# Patient Record
Sex: FEMALE | Race: WHITE | Employment: OTHER | URBAN - METROPOLITAN AREA
[De-identification: names, ages, dates, MRNs, and addresses within clinical notes are randomized per-mention and may not be internally consistent; named-entity substitution may affect disease eponyms.]

---

## 2017-02-14 ENCOUNTER — ALLSCRIPTS OFFICE VISIT (OUTPATIENT)
Dept: OTHER | Facility: OTHER | Age: 72
End: 2017-02-14

## 2017-06-06 ENCOUNTER — ALLSCRIPTS OFFICE VISIT (OUTPATIENT)
Dept: OTHER | Facility: OTHER | Age: 72
End: 2017-06-06

## 2017-10-03 ENCOUNTER — ALLSCRIPTS OFFICE VISIT (OUTPATIENT)
Dept: OTHER | Facility: OTHER | Age: 72
End: 2017-10-03

## 2017-10-04 NOTE — PROGRESS NOTES
Assessment  Assessed    1  Coronary arteriosclerosis (414 00) (I25 10)   2  Carotid artery disease (447 9) (I77 9)   3  Aortic stenosis (424 1) (I35 0)   4  Acquired insufficiency of aortic valve (424 1) (I35 1)   5  Hyperlipidemia (272 4) (E78 5)   6  Hypertension (401 9) (I10)   7  Obesity (278 00) (E66 9)   8  PVD (peripheral vascular disease) (443 9) (I73 9)   9  Tobacco use (305 1) (Z72 0)   10  Venous insufficiency (459 81) (I87 2)    Discussion/Summary  Cardiology Discussion Summary Free Text Note Form [facility]:   Patient is stable from a cardiac perspective no angina CHF symptomatically ectopy continue risk factor modification --- Smoking cessation strongly advised--patient declines  Recommend fu with pcp/vascular surgery to check carotid status peripheral vascular status-podiatry ophthalmology-  Continue all meds  Report any symptoms  All questions answered -atient with mild noncompromising edema due to venous disease worse with dependency suggest compression stockings if needed possible diuretics in the future if needed  Previous studies reviewed with patient, medications reviewed and possible side effects discussed  Continue risk factor modification  All questions answered  Safety measures reviewed  Patient advised to report any problems prompting medical attention  Discussed concepts of atherosclerosis, signs and symptoms of cardiac disease-including ischemia arrhythmia CHF  Optimize weight, regular exercise and follow up with appropriate specialists as discussed   Return for follow up visit in 4 months or earlier if neededquestions answeredwith PCP closely with lab testing cancer screening testing diabetic monitoring-strongly recommended the patient undergo mammography and routine cancer testing        Counseling Documentation With Imm: The patient was counseled regarding diagnostic results,-instructions for management,-risk factor reductions,-risks and benefits of treatment options,-importance of compliance with treatment  Chief Complaint  Chief Complaint Free Text Note Form: Patient presents for follow up cardiovascular evaluation  Has history of CAD, stent, Status post right carotid endarterectomy , peripheral vascular disease-COPD continues to smoke  History of Present Illness  Cardiology HPI Free Text Note Form [facility]: This is a 71 y/o female with a history of CAD, stent -- s/p right carotid endarterectomy , continues to smoke despite smoking cessation instruction Patient is feeling well from a cardiac perspective- she notes chronic, unchanged noncompromising, shortness of breath on exertion, she has a history of copd and feels it is stable despite chronic cough  No hemoptysis night sweats weight loss she denies any chest pain, pressure, heaviness  No angina, CHF, palpitations, syncope, seizures, CVA/TIA, claudication dizziness, lightheadedness, amaurosis, orthostasis, myositis or edema  No urinary or bowel complaints  No rashes, fevers, arrhythmic symptoms, or thromboembolic symptoms  No PND  chronic hip pain status post bilateral replacements-, has been following with orthopedics  no angina palpitations bleeding fevers depressionby ophthalmology told of the cataract   relates that in 2016 she was followed by vascular surgeon who found abnormalities in her left leg and put instent in her iliac and femoral arteries-Dr Moncho Rausch associateHe follows her regularly  claudication-patient had vascular ultrasound scheduled shortly with her vascular surgeons in 2017- carotid results demonstrate 50-69% bilateral stenosis asymptomatic--no neurologic symptoms- surgeons been following closely does not suggest intervention at this time  relates that 71-year-old granddaughter  of drug problemsstates her blood sugars were followed by Dr Laura Sanchez been running in the 80s    Had recent [facility] scan 2014 negative for ischemia, EF 75%   Echo 2016 aortic regurgitation- mild,, and mild aortic stenosis  continues to smoke about 2 packs/day and refuses to quit at this time- despite lengthy discussion on importance - her  smokes in addition  rashes, fevers, arrhythmic symptoms, thromboembolic symptoms  On plavix and ASA- denies any bleeding  neurologic symptoms, CVA, TIA, amaurosis no claudication,  - Dr Margaux Jon blood tests cancer screening testing  has a history of CAD, status post stent- RCA in 2002,, catheterization 2008 patent vesselsof diabetes, hypertension, hyperlipidemia, and smoking-- no polyuria polydipsia no myositis no wheezes hemoptysis night sweatshistory of rheumatic fever, adenopathy, serious valvular heart disease  No CVA, TIA, rheumatic disease    cardiac workup included stress testing, which was normal in 2009  Carotid ultrasound 50-69% stenosis right carotid in 2011  Echo in 2011 with EF 60% trace MR, aortic gradient peak 11  Chest x-ray July of 2012 with interstitial disease  Carotid Ultrasound 2014 with 80-99% stenosis on the right--subsequent endarterectomy -followed by vascular surgery regularly  claudication arrhythmic symptoms amaurosis CVA urinary or bowel complaints constitutional symptoms depression EKGs in the past shows sinus rhythm poor R-wave progression left atrial enlargement  follows with PCP for diabetes no polyuria polydipsia - suggest regular podiatry examinations cancer screening testing ophthalmology evaluations no myositis on statins no diarrhea or abdominal pain on metformin no hypoglycemia no bleeding on aspirin Plavix  had colonoscopy with the last 10 years  Has not had mammogram- does self checks and does not feel that she needs one  --Recommended that she undergo mammography-and reminded to discuss this with PCP  tests from February 2016 by Dr Ruddy Morales with cholesterol of 1:15 normal LFTs normal renal function sugar was 147 uric acid normal hematocrit 52 normal white count and plateletsfrom June 9493 with EF of 65% mild MR very mild aortic stenosis and mild aortic regurgitationstudy from June 2016 with 50-69% right stenosis less than 50% left stenosis  myositis on statins no bleeding on aspirin Plavix ration desires to continue this reviewed risks and benefits of bleeding history of PVD carotid disease CAD no coughing on ACEs no orthostasis taking insulin following up with PCP / specialists--- lab tests February 2017 sugar 120 renal function and electrolytes normal liver function tests normal triglycerides 153 hematocrit 51 A1c 6 5  CVA TIA amaurosis orthostasis myositis in good spirits         Review of Systems  Cardiology Female ROS:     Cardiac: No complaints of chest pain, no palpitations, no fainting  Skin: No complaints of nonhealing sores or skin rash  Genitourinary: No complaints of recurrent urinary tract infections, frequent urination at night, difficult urination, blood in urine, kidney stones, loss of bladder control, kidney problems, denies any birth control or hormone replacement, is not post menopausal, not currently pregnant  Psychological: No complaints of feeling depressed, anxiety, panic attacks, or difficulty concentrating  General: No complaints of trouble sleeping, lack of energy, fatigue, appetite changes, weight changes, fever, frequent infections, or night sweats  Respiratory: shortness of breath-and-cough/sputum-  Chronic shortness of breath with exertion unchanged occasional nonproductive cough no wheezing hemoptysis  HEENT: No complaints of serious problems, hearing problems, nose problems, throat problems, or snoring  Gastrointestinal: No complaints of liver problems, nausea, vomiting, heartburn, constipation, bloody stools, diarrhea, problems swallowing, adbominal pain, or rectal bleeding  Hematologic: No complaints of bleeding disorders, anemia, blood clots, or excessive brusing     Neurological: No complaints of numbness, tingling, dizziness, weakness, seizures, headaches, syncope or fainting, AM fatigue, daytime sleepiness, no witnessed apnea episodes  Musculoskeletal: arthritis-Status post bilateral hip surgery  Active Problems  Problems    1  Abnormal blood chemistry test (790 6) (R79 9)   2  Acquired insufficiency of aortic valve (424 1) (I35 1)   3  Aortic stenosis (424 1) (I35 0)   4  Carotid artery disease (447 9) (I77 9)   5  Carotid artery stenosis (433 10) (I65 29)   6  Coronary arteriosclerosis (414 00) (I25 10)   7  Diabetes mellitus (250 00) (E11 9)   8  Difficulty breathing (786 09) (R06 89)   9  Hyperlipidemia (272 4) (E78 5)   10  Hypertension (401 9) (I10)   11  Obesity (278 00) (E66 9)   12  Occlusion of carotid artery, unspecified laterality (433 10) (I65 29)   13  PVD (peripheral vascular disease) (443 9) (I73 9)   14  Shortness of breath (786 05) (R06 02)   15  Tobacco use (305 1) (Z72 0)    Past Medical History  Problems    1  History of Coronary Artery Disease (V12 59)   2  History of carotid artery stenosis (V12 59) (Z86 79)   3  History of chronic obstructive lung disease (V12 69) (Z87 09)   4  History of hyperlipidemia (V12 29) (Z86 39)   5  History of hypertension (V12 59) (Z86 79)   6  History of Type 1 diabetes mellitus without complication (032 38) (B01 9)  Active Problems And Past Medical History Reviewed: The active problems and past medical history were reviewed and updated today  Surgical History  Problems    1  History of Appendectomy   2  History of Cath Stent Placement   3  History of Cholecystectomy  Surgical History Reviewed: The surgical history was reviewed and updated today  Family History  Family History    1  Family history of Coronary Artery Disease (V17 49)   2  Family history of Diabetes Mellitus (V18 0)  Family History Reviewed: The family history was reviewed and updated today         Social History  Problems    · Denied: History of Alcohol Use (History)   · Current Smoker (305 1)   · Current Smoker (V15 82)   · Tobacco use (305 1) (Z72 0)  Social History Reviewed: The social history was reviewed and updated today  The social history was reviewed and is unchanged  Current Meds   1  Acidophilus CAPS Recorded   2  Aspirin 81 MG TABS; Take 1 tablet daily Recorded   3  Atorvastatin Calcium 20 MG Oral Tablet; TAKE 1 TABLET DAILY; Therapy: [id number] to (Evaluate:22Jun2016) Recorded   4  Clopidogrel Bisulfate 75 MG Oral Tablet; Take 1 tablet daily Recorded   5  GlipiZIDE 5 MG Oral Tablet; 1 tab TID; Therapy: (Recorded:17Mar2015) to Recorded   6  Invokana 100 MG Oral Tablet; 1/2 tab po daily; Therapy: (Recorded:06Jun2017) to Recorded   7  Lisinopril 5 MG Oral Tablet; Take 1 tablet daily Recorded   8  MetFORMIN HCl - 1000 MG Oral Tablet; Take 1 tablet twice daily Recorded   9  Metoprolol Tartrate 50 MG Oral Tablet; TAKE 1 TABLET TWICE DAILY; Therapy: (Recorded:17Mar2015) to Recorded   10  Toujeo SoloStar 300 UNIT/ML Subcutaneous Solution Pen-injector; 23 units qhs;    Therapy: [id number] to Recorded   11  Vitamin B6 TABS; Therapy: (Recorded:07Apr2014) to Recorded  Medication List Reviewed: The medication list was reviewed and updated today  Allergies  Medication    1  No Known Drug Allergies    Vitals  Vital Signs    Recorded: [id number] 09:00AM   Heart Rate 77   Systolic 489   Diastolic 72   Height 5 ft    Weight 157 lb 2 08 oz   BMI Calculated 30 69   BSA Calculated 1 68     Physical Exam    Constitutional   General appearance: Abnormal  -Obese female in no distress accompanied by friend  Eyes   Conjunctiva and Sclera examination: Conjunctiva pink, sclera anicteric  Ears, Nose, Mouth, and Throat - External inspection of ears and nose: Normal without deformities or discharge -Nasal mucosa, septum, and turbinates: Normal, no edema or discharge -Oropharynx: Clear, nares are clear, mucous membranes are moist    Neck   Neck and thyroid: Normal, supple, trachea midline, no thyromegaly      Pulmonary   Respiratory effort: No increased work of breathing or signs of respiratory distress  Auscultation of lungs: Abnormal  -diminshed breath sounds, slightwheeze heard biltearlly scattered rhonchi, cough  Cardiovascular   Palpation of heart: Normal PMI, no thrills  Auscultation of heart: Abnormal  -S1-S2 normal no diastolic murmurs gallops thrills rubs 2/6 EM KOFI, right sternal border, radiating to apex  Carotid pulses: Abnormal  -bruits bilaterally  Femoral pulses: Abnormal  -soft left femoral bruit  Pedal pulses: Abnormal  -R +2, L+2  Examination of extremities for edema and/or varicosities: Abnormal  -Trace edema mild venous insufficiency skin changes  Chest - Chest: Normal    Abdomen   Abdomen: Non-tender and no distention  -Old cholecystectomy scar  Liver and spleen: No hepatomegaly or splenomegaly  Musculoskeletal Gait and station: Normal gait  -Digits and nails: Normal without clubbing or cyanosis  Skin - Skin and subcutaneous tissue: Normal without rashes or lesions  Skin is warm and well perfused, normal turgor  -appendectomy and gall bladder scars- healed well -Well-healed right carotid endarterectomy incision  Neurologic - Speech: Normal     Psychiatric - Orientation to person, place, and time: Normal -Mood and affect: Normal    Additional Findings - Bilateral hip surgery        Signatures   Electronically signed by : UMA Erwin ; Oct  3 2017 12:39PM EST                       (Author)

## 2017-11-09 ENCOUNTER — GENERIC CONVERSION - ENCOUNTER (OUTPATIENT)
Dept: OTHER | Facility: OTHER | Age: 72
End: 2017-11-09

## 2018-01-13 VITALS
SYSTOLIC BLOOD PRESSURE: 124 MMHG | HEART RATE: 88 BPM | HEIGHT: 60 IN | DIASTOLIC BLOOD PRESSURE: 70 MMHG | WEIGHT: 155.5 LBS | BODY MASS INDEX: 30.53 KG/M2

## 2018-01-13 VITALS
WEIGHT: 161.13 LBS | BODY MASS INDEX: 31.64 KG/M2 | SYSTOLIC BLOOD PRESSURE: 118 MMHG | HEART RATE: 84 BPM | HEIGHT: 60 IN | DIASTOLIC BLOOD PRESSURE: 62 MMHG

## 2018-01-14 VITALS
BODY MASS INDEX: 30.85 KG/M2 | SYSTOLIC BLOOD PRESSURE: 122 MMHG | HEIGHT: 60 IN | DIASTOLIC BLOOD PRESSURE: 72 MMHG | WEIGHT: 157.13 LBS | HEART RATE: 77 BPM

## 2018-05-23 RX ORDER — SELENIUM 50 MCG
TABLET ORAL DAILY
COMMUNITY

## 2018-05-23 RX ORDER — ATORVASTATIN CALCIUM 20 MG/1
1 TABLET, FILM COATED ORAL DAILY
COMMUNITY
Start: 2016-02-23

## 2018-05-23 RX ORDER — GLIPIZIDE 5 MG/1
1 TABLET ORAL 3 TIMES DAILY
COMMUNITY

## 2018-05-23 RX ORDER — MULTIVITAMIN WITH IRON
2 TABLET ORAL 2 TIMES DAILY
COMMUNITY

## 2018-05-23 RX ORDER — LISINOPRIL 5 MG/1
10 TABLET ORAL DAILY
COMMUNITY
End: 2019-11-11 | Stop reason: SDUPTHER

## 2018-05-23 RX ORDER — METOPROLOL TARTRATE 75 MG/1
1 TABLET, FILM COATED ORAL 2 TIMES DAILY
COMMUNITY
End: 2020-01-21

## 2018-05-23 RX ORDER — CLOPIDOGREL BISULFATE 75 MG/1
1 TABLET ORAL DAILY
COMMUNITY

## 2018-06-05 ENCOUNTER — OFFICE VISIT (OUTPATIENT)
Dept: CARDIOLOGY CLINIC | Facility: CLINIC | Age: 73
End: 2018-06-05
Payer: MEDICARE

## 2018-06-05 VITALS
SYSTOLIC BLOOD PRESSURE: 124 MMHG | HEIGHT: 60 IN | WEIGHT: 157 LBS | HEART RATE: 74 BPM | BODY MASS INDEX: 30.82 KG/M2 | OXYGEN SATURATION: 96 % | DIASTOLIC BLOOD PRESSURE: 78 MMHG

## 2018-06-05 DIAGNOSIS — I65.23 ARTERIOSCLEROSIS OF BOTH CAROTID ARTERIES: ICD-10-CM

## 2018-06-05 DIAGNOSIS — I35.1 NONRHEUMATIC AORTIC VALVE INSUFFICIENCY: ICD-10-CM

## 2018-06-05 DIAGNOSIS — E78.2 MIXED HYPERLIPIDEMIA: ICD-10-CM

## 2018-06-05 DIAGNOSIS — Z72.0 TOBACCO ABUSE: ICD-10-CM

## 2018-06-05 DIAGNOSIS — I25.10 CAD S/P PERCUTANEOUS CORONARY ANGIOPLASTY: Primary | ICD-10-CM

## 2018-06-05 DIAGNOSIS — I73.9 PAD (PERIPHERAL ARTERY DISEASE) (HCC): ICD-10-CM

## 2018-06-05 DIAGNOSIS — I35.0 NON-RHEUMATIC AORTIC STENOSIS: ICD-10-CM

## 2018-06-05 DIAGNOSIS — I10 ESSENTIAL HYPERTENSION: ICD-10-CM

## 2018-06-05 DIAGNOSIS — Z98.61 CAD S/P PERCUTANEOUS CORONARY ANGIOPLASTY: Primary | ICD-10-CM

## 2018-06-05 DIAGNOSIS — I83.93 VARICOSE VEINS OF BOTH LOWER EXTREMITIES: ICD-10-CM

## 2018-06-05 PROCEDURE — 99215 OFFICE O/P EST HI 40 MIN: CPT | Performed by: INTERNAL MEDICINE

## 2018-06-05 NOTE — PROGRESS NOTES
CARDIOLOGY OFFICE VISIT  [facility]  [address], [facility], [address] [facility], [facility] [place] Vicente  Tel: [phone]      NAME: Sherrie Cheema  AGE: 71 y o  SEX: female  : 1945   MRN: [id number]      Chief Complaint:  Chief Complaint   Patient presents with    Follow-up     formed Dr Mary Jo Hayes patient  AS, HLD, HTN  History of Present Illness:   Patient of Dr Mary Jo Hayes who comes for follow up  States she is doing well from cardiac stand point and denies chest pain / pressure, palpitations, lightheadedness, syncope, swelling feet, orthopnea, PND, claudication  She notes chronic, unchanged, noncompromising, shortness of breath on exertion - she has a history of copd and feels it is stable  CAD s/p RCA stent  -  States is doing well cardiac wise with no cardiac symptoms  Taking all medications regularly  On ASA, statin, BB, ACEI    HTN -  Has been hypertensive for many years  Taking medications regularly  Denies lightheadedness, headache, medication side effects  HLP -  Has had hyperlipidemia for many years  Taking statin regularly along with diet control  Denies myalgia  PCP closely monitoring the blood work  AS, AI - stable  Asymptomatic    Carotid arteriosclerosis bilateral - S/P right carotid endarterectomy   On aspirin, Plavix, statin  Follows with vascular surgeon    PAD - S/P stents in her left iliac and femoral arteries in 2016  On aspirin, Plavix, statin    Follows with vascular surgeon    Bilateral varicose veins in the legs    Tobacco abuse - continues to smoke despite rptd smoking cessation counseling       Past Medical History:  Past Medical History:   Diagnosis Date    Aortic stenosis     Aortic stenosis     Coronary arteriosclerosis     Coronary arteriosclerosis     Coronary artery disease     Coronary artery sclerosis     Diabetes mellitus ([facility] [address] )     Hyperlipidemia     Hypertension     Obesity  Occlusion of carotid artery     Peripheral artery disease (HCC)          Past Surgical History:  Past Surgical History:   Procedure Laterality Date    APPENDECTOMY      CHOLECYSTECTOMY      CORONARY STENT PLACEMENT           Family History:  Family History   Problem Relation Age of Onset    Coronary artery disease Neg Hx     Diabetes Neg Hx          Social History:  Social History     Social History    Marital status: /Civil Union     Spouse name: N/A    Number of children: N/A    Years of education: N/A     Social History Main Topics    Smoking status: Current Every Day Smoker    Smokeless tobacco: Not on file    Alcohol use No    Drug use: Unknown    Sexual activity: Not on file     Other Topics Concern    Not on file     Social History Narrative    No narrative on file         Active Problems:  Patient Active Problem List   Diagnosis    CAD S/P percutaneous coronary angioplasty    Essential hypertension    Mixed hyperlipidemia    Non-rheumatic aortic stenosis    Nonrheumatic aortic valve insufficiency    [facility]AD [facility]peripheral artery disease) ([facility])    Tobacco abuse    Varicose veins of both lower extremities         The following portions of the patient's history were reviewed and updated as appropriate: past medical history, past surgical history, past family history,  past social history, current medications, allergies and problem list       Review of Systems:  Constitutional: Denies fever, chills, fatigue  Eyes: Denies eye redness, eye discharge, double vision  ENT: Denies hearing loss, tinnitus, sneezing, nasal discharge, sore throat   Respiratory: Denies cough, expectoration, hemoptysis   +shortness of breath  Cardiovascular: Denies chest pain, palpitations, orthopnea, PND, lower extremity swelling  Gastrointestinal: Denies abdominal pain, nausea, vomiting, hematemesis, diarrhea, bloody stools  Genito-Urinary: Denies dysuria, incontinence  Musculoskeletal: Denies back pain, joint pain, muscle pain  Neurologic: Denies confusion, lightheadedness, syncope, headache, focal weakness, sensory changes, seizures  Endocrine: Denies polyuria, polydipsia, temperature intolerance  Allergy and Immunology: Denies hives, insect bite sensitivity  Hematological and Lymphatic: Denies bleeding problems, swollen glands   Psychological: Denies depression, suicidal ideation, anxiety, panic  Dermatological: Denies pruritus, rash, skin lesion changes      Vitals:  Vitals:    06/05/18 0902   BP: 124/78   Pulse: 74   SpO2: 96%       Body mass index is 30 66 kg/m²  Weight (last 2 days)     Date/Time   Weight    06/05/18 0902  71 2 (157)                Physical Examination:  General: Patient is not in acute distress  Awake, alert, oriented in time, place and person  Responding to commands  Head: Normocephalic  Atraumatic  Eyes: Both pupils normal sized, round and reactive to light  Nonicteric  ENT: Normal external ear canals  Nares normal, no drainage  Lips and oral mucosa normal  Neck: Supple  JVP not raised  Trachea central  No thyromegaly  Lungs: Bilateral bronchovascular breath sounds with no crackles or rhonchi  Chest wall: No tenderness  Cardiovascular: RRR  S1 and S2 normal  Grade 3/6 KOFI at base  No rub or gallop  Gastrointestinal: Abdomen soft, nontender  No guarding or rigidity  Liver and spleen not palpable  Bowel sounds present  Neurologic: Patient is awake, alert, oriented in time, place and person  Responding to command  Moving all extremities  Integumentary:  No skin rash  Lymphatic: No cervical lymphadenopathy  Back: Symmetric   No CVA tenderness  Extremities: No clubbing, cyanosis or edema      Cardiac testing:   Results for orders placed during the hospital encounter of 06/15/16   Echo complete with contrast if indicated    [place] [address], [address] [place] [address]  [phone]    Transthoracic Echocardiogram  2D, M-mode, and Color Doppler    Study date: 15-Curtis-2016    Patient: Evita Sheehan  MR number: [id number]  Account number: [de-identified]  : [id number]  Age: 72 years  Gender: Female  Status: Outpatient  Location: [facility]  Height: 60 in  Weight: 161 lb  BP: 122/ 60 mmHg    Indications: CAD  Diagnoses: I25 10 - Atherosclerotic heart disease of native coronary artery  without angina pectoris    Sonographer:  Scout Bone, ANGEL  Primary Physician:  Krys Rodriguez DO  Referring Physician:  Ronda Naqvi MD  Group:  [facility]  Interpreting Physician:  Ronda Naqvi MD    SUMMARY    LEFT VENTRICLE:  There were no regional wall motion abnormalities  There was mild concentric hypertrophy  Doppler parameters were consistent with high ventricular filling pressure  LEFT ATRIUM:  The atrium was mildly dilated  MITRAL VALVE:  There was moderate annular calcification  There was mild regurgitation  AORTIC VALVE:  There was very mild stenosis  There was mild regurgitation  TRICUSPID VALVE:  There was trace regurgitation  AORTA:  The root exhibited normal size and mild fibrocalcific change  COMPARISONS:  no significant change from     HISTORY: PRIOR HISTORY: CAD  Risk factors: hypertension, insulin-controlled  diabetes, oral hypoglycemic-treated diabetes, medication-treated  hypercholesterolemia, morbid obesity, a history of current cigarette use  (within the last month), and a family history of coronary artery disease  Chronic lung disease  PRIOR PROCEDURES: Diagnostic cath  Stent  PROCEDURE: The study was performed in the [address] [place] [address]  This  was a routine study  The transthoracic approach was used  The study included  complete 2D imaging, M-mode, and color Doppler  The heart rate was 70 bpm, at  the start of the study  Images were obtained from the parasternal, apical,  subcostal, and suprasternal notch acoustic windows  Image quality was adequate      LEFT VENTRICLE: Size was normal  Systolic function was by visual assessment  Ejection fraction was estimated to be 65 %  There were no regional wall motion  abnormalities  There was mild concentric hypertrophy  DOPPLER: Doppler  parameters were consistent with high ventricular filling pressure  RIGHT VENTRICLE: The size was normal  Systolic function was normal  Wall  thickness was normal     LEFT ATRIUM: The atrium was mildly dilated  RIGHT ATRIUM: Size was normal     MITRAL VALVE: There was moderate annular calcification  DOPPLER: There was mild  regurgitation  AORTIC VALVE: DOPPLER: There was very mild stenosis  There was mild  regurgitation  TRICUSPID VALVE: DOPPLER: There was trace regurgitation  PERICARDIUM: There was no pericardial effusion  The pericardium was normal in  appearance  AORTA: The root exhibited normal size and mild fibrocalcific change  PULMONARY ARTERY: DOPPLER: Systolic pressure was within the normal range  SYSTEM MEASUREMENT TABLES    Apical four chamber  4 chamber Left Atrium Volume Index; Planimetry; End Systole; Apical four  chamber;: 14 2 cm2 Left Ventricular Diastolic Area; Method of Disks, Single  Plane; End Diastole; Apical four chamber;: 22 54 cm2 Left Ventricular Ejection  Fraction; Method of Disks, Single Plane; Apical four chamber;: 68 % Left  Ventricular systolic Area; Method of Disks, Single Plane; End Systole; Apical  four chamber;: 11 68 cm2 Right Atrium Systolic Area; Planimetry; End Systole; Apical four chamber;: 12 25 cm2 Right Ventricular Internal Diastolic Dimension; End Diastole; Apical four chamber;: 32 2 mm    Unspecified Scan Mode  Aortic Root Diameter; End Systole;: 28 5 mm  Aortic valve Area; Continuity Equation by Velocity Time Integral; Systole;:  1 42 cm2  Cardiovascular Orifice Diameter; End Systole;: 18 6 mm  Gradient Pressure, Peak; Simplified Bernoulli;  Antegrade Flow; Systole;: 16 1  mm[Hg] Gradient pressure, average; Simplified Bernoulli; Antegrade Flow;  Systole;: 8 mm[Hg]  Left atrial diameter; End Diastole;: 39 9 mm  Cardiac Output; Teichholz; Systole;: 3 42 L/min  Heart rate; Teichholz;: 70 /min  Interventricular Septum Diastolic Thickness; Teichholz; End Diastole;: 11 5 mm  Left Ventricle Internal End Diastolic Dimension; Teichholz;: 39 8 mm  Left Ventricle Internal Systolic Dimension; Teichholz; End Systole;: 24 1 mm  Left Ventricle Mass; Mass AVCube with Teichholz; End Diastole;: 144 g  Left Ventricle Posterior Wall Diastolic Thickness; Teichholz; End Diastole;:  10 4 mm  Left Ventricular Ejection Fraction; Teichholz;: 70 5 %  Left Ventricular End Diastolic Volume; Teichholz;: 69 2 ml  Left Ventricular End Systolic Volume; Teichholz;: 20 4 ml  Left Ventricular Fractional Shortening;: 39 4 %  Stroke volume; Teichholz; Systole;: 48 8 ml  Gradient Pressure, Peak; Simplified Bernoulli; Antegrade Flow; Diastole;: 6 7  mm[Hg] Gradient pressure, average; Simplified Bernoulli;  Antegrade Flow;  Diastole;: 2 8 mm[Hg]  Mitral Valve Area; Area by Pressure Half-Time; Systole;: 2 18 cm2  Mitral Valve E to A Ratio; Systole;: 0 95  Maximum Tricuspid valve regurgitation pressure gradient; Regurgitant Flow;  Systole;: 20 6 mm[Hg]    [facility]    Prepared and electronically signed by    Ronda Naqvi MD  Signed 15-Curtis-2016 13:20:51         Medications:    Current Outpatient Prescriptions:     aspirin 81 MG tablet, Take 1 tablet by mouth daily, Disp: , Rfl:     atorvastatin (LIPITOR) 20 mg tablet, Take 1 tablet by mouth daily, Disp: , Rfl:     clopidogrel (PLAVIX) 75 mg tablet, Take 1 tablet by mouth daily, Disp: , Rfl:     glipiZIDE (GLUCOTROL) 5 mg tablet, Take 1 tablet by mouth 3 (three) times a day, Disp: , Rfl:     lactobacillus acidophilus, Take by mouth, Disp: , Rfl:     lisinopril (ZESTRIL) 5 mg tablet, Take 1 tablet by mouth daily, Disp: , Rfl:     metFORMIN (GLUCOPHAGE) 1000 MG tablet, Take 1 tablet by mouth 2 (two) times a day, Disp: , Rfl:     metoprolol tartrate (LOPRESSOR) 50 mg tablet, Take 1 tablet by mouth 2 (two) times a day, Disp: , Rfl:     Pyridoxine HCl (VITAMIN B-6 PO), Take by mouth, Disp: , Rfl:       Allergies:  No Known Allergies      Assessment and Plan:  1  CAD S/P percutaneous coronary angioplasty  Stable  Continue aspirin, statin, beta-blocker    2  Essential hypertension  BP at goal   Continue current medications    3  Mixed hyperlipidemia  Continue statin and diet control  Her PCP closely monitor the blood work    4  Non-rheumatic aortic stenosis  Stable  Asymptomatic  Follow-up with serial echocardiograms    5  Nonrheumatic aortic valve insufficiency  Stable  Asymptomatic  Follow-up with serial echocardiograms    6  Arteriosclerosis of both carotid arteries  Continue aspirin, Plavix, statin  Follow-up with vascular surgeon    7  PAD (peripheral artery disease) (HCC)  Continue aspirin, Plavix, statin  Follow-up with vascular surgeon    8  Varicose veins of both lower extremities  Keep legs elevated while in bed  9  Tobacco abuse  Strongly counseled to stop smoking    Recommend aggressive risk factor modification and therapeutic lifestyle changes  Low-salt, low-calorie, low-fat, low-cholesterol diet with regular exercise and to optimize weight  I will defer the ordering and monitoring of necessity lab studies to you, but I am available and happy to review and manage any of the data at your request in the future  Discussed concepts of atherosclerosis, including signs and symptoms of cardiac disease  Previous studies were reviewed  Safety measures were reviewed  Questions were entertained and answered  Patient was advised to report any problems requiring medical attention  Follow-up with PCP and appropriate specialist and lab work as discussed  Return for follow up visit as scheduled or earlier, if needed    Thank you for allowing me to participate in the care and evaluation of your patient  Should you have any questions, please feel free to contact me        Lizz De La Cruz MD  9/5/1940,5:36 AM

## 2018-10-30 ENCOUNTER — OFFICE VISIT (OUTPATIENT)
Dept: CARDIOLOGY CLINIC | Facility: CLINIC | Age: 73
End: 2018-10-30
Payer: MEDICARE

## 2018-10-30 VITALS
BODY MASS INDEX: 30.86 KG/M2 | HEART RATE: 85 BPM | WEIGHT: 157.2 LBS | HEIGHT: 60 IN | SYSTOLIC BLOOD PRESSURE: 112 MMHG | DIASTOLIC BLOOD PRESSURE: 68 MMHG | OXYGEN SATURATION: 92 % | RESPIRATION RATE: 18 BRPM

## 2018-10-30 DIAGNOSIS — I25.10 CAD S/P PERCUTANEOUS CORONARY ANGIOPLASTY: Primary | ICD-10-CM

## 2018-10-30 DIAGNOSIS — E78.2 MIXED HYPERLIPIDEMIA: ICD-10-CM

## 2018-10-30 DIAGNOSIS — I83.93 ASYMPTOMATIC VARICOSE VEINS OF BOTH LOWER EXTREMITIES: ICD-10-CM

## 2018-10-30 DIAGNOSIS — Z72.0 TOBACCO ABUSE: ICD-10-CM

## 2018-10-30 DIAGNOSIS — I73.9 PAD (PERIPHERAL ARTERY DISEASE) (HCC): ICD-10-CM

## 2018-10-30 DIAGNOSIS — I65.23 ARTERIOSCLEROSIS OF BOTH CAROTID ARTERIES: ICD-10-CM

## 2018-10-30 DIAGNOSIS — I35.0 NON-RHEUMATIC AORTIC STENOSIS: ICD-10-CM

## 2018-10-30 DIAGNOSIS — I35.1 NONRHEUMATIC AORTIC VALVE INSUFFICIENCY: ICD-10-CM

## 2018-10-30 DIAGNOSIS — I10 ESSENTIAL HYPERTENSION: ICD-10-CM

## 2018-10-30 DIAGNOSIS — Z98.61 CAD S/P PERCUTANEOUS CORONARY ANGIOPLASTY: Primary | ICD-10-CM

## 2018-10-30 PROCEDURE — 99214 OFFICE O/P EST MOD 30 MIN: CPT | Performed by: INTERNAL MEDICINE

## 2018-10-30 NOTE — PROGRESS NOTES
CARDIOLOGY OFFICE VISIT  [facility]  [facility], [place], [address] [place] [address], [place], [facility]  Tel: [phone]      NAME: Garrett Baez  AGE: 71 y o  SEX: female  : 1945   MRN: [id number]      Chief Complaint:  Chief Complaint   Patient presents with    Follow-up     pt states just a check up          History of Present Illness:   Patient comes for follow up  States she is doing well from cardiac stand point and denies chest pain / pressure,  palpitations, lightheadedness, syncope, swelling feet, orthopnea, PND, claudication  She notes chronic unchanged non compromising shortness of breath on exertion-she has a history of COPD and feels it is stable  She is scheduled to see her PCP soon and will discuss COPD management with him    CAD s/p RCA stent  -  States is doing well cardiac wise with no cardiac symptoms  Taking all medications (ASA, statin, BB, ACEI) regularly  Has not used SL NTG since the last clinic visit  HTN -  Has been hypertensive for many years  Taking medications regularly  Denies lightheadedness, headache, medication side effects  HLP -  Has had hyperlipidemia for many years  Taking statin regularly along with diet control  Denies myalgia  PCP closely monitoring the blood work  AS, AI - stable  Asymptomatic     Carotid arteriosclerosis bilateral - S/P right carotid endarterectomy   On aspirin, Plavix, statin  Follows with vascular surgeon     PAD - S/P stents in her left iliac and femoral arteries in 2016  On aspirin, Plavix, statin  Follows with vascular surgeon     Bilateral varicose veins in the legs   No swelling currently     Tobacco abuse - continues to smoke despite rptd smoking cessation counseling        Past Medical History:  Past Medical History:   Diagnosis Date    Aortic stenosis     Aortic stenosis     Coronary arteriosclerosis     Coronary arteriosclerosis     Coronary artery disease     Coronary artery sclerosis     Diabetes mellitus ([facility] [address] )     Hyperlipidemia     Hypertension     Obesity     Occlusion of carotid artery     Peripheral artery disease ([facility])          Past Surgical History:  Past Surgical History:   Procedure Laterality Date    APPENDECTOMY      CHOLECYSTECTOMY      CORONARY STENT PLACEMENT           Family History:  Family History   Problem Relation Age of Onset    Coronary artery disease Neg Hx     Diabetes Neg Hx          Social History:  Social History     Social History    Marital status: /Civil Union     Spouse name: N/A    Number of children: N/A    Years of education: N/A     Social History Main Topics    Smoking status: Current Every Day Smoker    Smokeless tobacco: Never Used    Alcohol use No    Drug use: Unknown    Sexual activity: Not Asked     Other Topics Concern    None     Social History Narrative    None         Active Problems:  Patient Active Problem List   Diagnosis    CAD S/P percutaneous coronary angioplasty    Essential hypertension    Mixed hyperlipidemia    Non-rheumatic aortic stenosis    Nonrheumatic aortic valve insufficiency    PAD [facility]peripheral artery disease) ([facility])    Tobacco abuse    Varicose veins of both lower extremities    Arteriosclerosis of both carotid arteries         The following portions of the patient's history were reviewed and updated as appropriate: past medical history, past surgical history, past family history,  past social history, current medications, allergies and problem list       Review of Systems:  Constitutional: Denies fever, chills, fatigue  Eyes: Denies eye redness, eye discharge, double vision  ENT: Denies hearing loss, tinnitus, sneezing, nasal discharge, sore throat   Respiratory: Denies hemoptysis   +shortness of breath +cough  Cardiovascular: Denies chest pain, palpitations, orthopnea, PND, lower extremity swelling  Gastrointestinal: Denies abdominal pain, nausea, vomiting, hematemesis, diarrhea, bloody stools  Genito-Urinary: Denies dysuria, incontinence  Musculoskeletal: Denies back pain  Neurologic: Denies confusion, lightheadedness, syncope, headache, focal weakness, sensory changes, seizures  Endocrine: Denies polyuria, polydipsia, temperature intolerance  Allergy and Immunology: Denies hives, insect bite sensitivity  Hematological and Lymphatic: Denies bleeding problems, swollen glands   Psychological: Denies depression, suicidal ideation, anxiety, panic  Dermatological: Denies pruritus, rash, skin lesion changes      Vitals:  Vitals:    10/30/18 0956   BP: 112/68   Pulse: 85   Resp: 18   SpO2: 92%       Body mass index is 30 7 kg/m²  Weight (last 2 days)     Date/Time   Weight    10/30/18 0956  71 3 (157 2)                Physical Examination:  General: Patient is not in acute distress  Awake, alert, oriented in time, place and person  Responding to commands  Head: Normocephalic  Atraumatic  Eyes: Both pupils normal sized, round and reactive to light  Nonicteric  ENT: Normal external ear canals  Nares normal, no drainage  Lips and oral mucosa normal  Neck: Supple  JVP not raised  Trachea central  No thyromegaly  Lungs: Bilateral minimal wheezing+  Chest wall: No tenderness  Cardiovascular: RRR  S1 and S2 normal  Grade 2/6 KOFI at base  No rub or gallop  Gastrointestinal: Abdomen soft, nontender  No guarding or rigidity  Liver and spleen not palpable  Bowel sounds present  Neurologic: Patient is awake, alert, oriented in time, place and person  Responding to command  Moving all extremities  Integumentary:  No skin rash  Lymphatic: No cervical lymphadenopathy  Back: Symmetric   No CVA tenderness  Extremities: No clubbing, cyanosis or edema      Laboratory Results:  Cardiac testing:   Results for orders placed during the hospital encounter of 06/15/16   Echo complete with contrast if indicated    Narrative [place] , [place] [address]  [phone]    Transthoracic Echocardiogram  2D, M-mode, and Color Doppler    Study date:  15-Curtis-2016    Patient: Richard Doss  MR number: [id number]  Account number: [de-identified]  : [id number]  Age: 72 years  Gender: Female  Status: Outpatient  Location: [facility]  Height: 60 in  Weight: 161 lb  BP: 122/ 60 mmHg    Indications: CAD  Diagnoses: I25 10 - Atherosclerotic heart disease of native coronary artery  without angina pectoris    Sonographer:  Armendariz RCS  Primary Physician:  Shasta Sicard, DO  Referring Physician:  Hawa Madera MD  Group:  [facility]  Interpreting Physician:  Hawa Madera MD    SUMMARY    LEFT VENTRICLE:  There were no regional wall motion abnormalities  There was mild concentric hypertrophy  Doppler parameters were consistent with high ventricular filling pressure  LEFT ATRIUM:  The atrium was mildly dilated  MITRAL VALVE:  There was moderate annular calcification  There was mild regurgitation  AORTIC VALVE:  There was very mild stenosis  There was mild regurgitation  TRICUSPID VALVE:  There was trace regurgitation  AORTA:  The root exhibited normal size and mild fibrocalcific change  COMPARISONS:  no significant change from     HISTORY: PRIOR HISTORY: CAD  Risk factors: hypertension, insulin-controlled  diabetes, oral hypoglycemic-treated diabetes, medication-treated  hypercholesterolemia, morbid obesity, a history of current cigarette use  (within the last month), and a family history of coronary artery disease  Chronic lung disease  PRIOR PROCEDURES: Diagnostic cath  Stent  PROCEDURE: The study was performed in the [address] [place] [address]  This  was a routine study  The transthoracic approach was used  The study included  complete 2D imaging, M-mode, and color Doppler  The heart rate was 70 bpm, at  the start of the study   Images were obtained from the parasternal, apical,  subcostal, and suprasternal notch acoustic windows  Image quality was adequate  LEFT VENTRICLE: Size was normal  Systolic function was by visual assessment  Ejection fraction was estimated to be 65 %  There were no regional wall motion  abnormalities  There was mild concentric hypertrophy  DOPPLER: Doppler  parameters were consistent with high ventricular filling pressure  RIGHT VENTRICLE: The size was normal  Systolic function was normal  Wall  thickness was normal     LEFT ATRIUM: The atrium was mildly dilated  RIGHT ATRIUM: Size was normal     MITRAL VALVE: There was moderate annular calcification  DOPPLER: There was mild  regurgitation  AORTIC VALVE: DOPPLER: There was very mild stenosis  There was mild  regurgitation  TRICUSPID VALVE: DOPPLER: There was trace regurgitation  PERICARDIUM: There was no pericardial effusion  The pericardium was normal in  appearance  AORTA: The root exhibited normal size and mild fibrocalcific change  PULMONARY ARTERY: DOPPLER: Systolic pressure was within the normal range  SYSTEM MEASUREMENT TABLES    Apical four chamber  4 chamber Left Atrium Volume Index; Planimetry; End Systole; Apical four  chamber;: 14 2 cm2 Left Ventricular Diastolic Area; Method of Disks, Single  Plane; End Diastole; Apical four chamber;: 22 54 cm2 Left Ventricular Ejection  Fraction; Method of Disks, Single Plane; Apical four chamber;: 68 % Left  Ventricular systolic Area; Method of Disks, Single Plane; End Systole; Apical  four chamber;: 11 68 cm2 Right Atrium Systolic Area; Planimetry; End Systole; Apical four chamber;: 12 25 cm2 Right Ventricular Internal Diastolic Dimension; End Diastole; Apical four chamber;: 32 2 mm    Unspecified Scan Mode  Aortic Root Diameter; End Systole;: 28 5 mm  Aortic valve Area; Continuity Equation by Velocity Time Integral; Systole;:  1 42 cm2  Cardiovascular Orifice Diameter;  End Systole;: 18 6 mm  Gradient Pressure, Peak; Simplified Bernoulli; Antegrade Flow; Systole;: 16 1  mm[Hg] Gradient pressure, average; Simplified Bernoulli; Antegrade Flow;  Systole;: 8 mm[Hg]  Left atrial diameter; End Diastole;: 39 9 mm  Cardiac Output; Teichholz; Systole;: 3 42 L/min  Heart rate; Teichholz;: 70 /min  Interventricular Septum Diastolic Thickness; Teichholz; End Diastole;: 11 5 mm  Left Ventricle Internal End Diastolic Dimension; Teichholz;: 39 8 mm  Left Ventricle Internal Systolic Dimension; Teichholz; End Systole;: 24 1 mm  Left Ventricle Mass; Mass AVCube with Teichholz; End Diastole;: 144 g  Left Ventricle Posterior Wall Diastolic Thickness; Teichholz; End Diastole;:  10 4 mm  Left Ventricular Ejection Fraction; Teichholz;: 70 5 %  Left Ventricular End Diastolic Volume; Teichholz;: 69 2 ml  Left Ventricular End Systolic Volume; Teichholz;: 20 4 ml  Left Ventricular Fractional Shortening;: 39 4 %  Stroke volume; Teichholz; Systole;: 48 8 ml  Gradient Pressure, Peak; Simplified Bernoulli; Antegrade Flow; Diastole;: 6 7  mm[Hg] Gradient pressure, average; Simplified Bernoulli;  Antegrade Flow;  Diastole;: 2 8 mm[Hg]  Mitral Valve Area; Area by Pressure Half-Time; Systole;: 2 18 cm2  Mitral Valve E to A Ratio; Systole;: 0 95  Maximum Tricuspid valve regurgitation pressure gradient; Regurgitant Flow;  Systole;: 20 6 mm[Hg]    [facility]    Prepared and electronically signed by    Hawa Madera MD  Signed 15-Curtis-2016 13:20:51         Medications:    Current Outpatient Prescriptions:     aspirin 81 MG tablet, Take 1 tablet by mouth daily, Disp: , Rfl:     atorvastatin (LIPITOR) 20 mg tablet, Take 1 tablet by mouth daily, Disp: , Rfl:     clopidogrel (PLAVIX) 75 mg tablet, Take 1 tablet by mouth daily, Disp: , Rfl:     glipiZIDE (GLUCOTROL) 5 mg tablet, Take 1 tablet by mouth 3 (three) times a day, Disp: , Rfl:     glucose blood test strip, 1 each by Other route as needed Use as instructed, Disp: , Rfl:     Insulin Glargine (BASAGLAR KWIKPEN SC), Inject 23 Units under the skin daily at bedtime, Disp: , Rfl:     lisinopril (ZESTRIL) 5 mg tablet, Take 1 tablet by mouth daily, Disp: , Rfl:     metFORMIN (GLUCOPHAGE) 1000 MG tablet, Take 1 tablet by mouth 2 (two) times a day, Disp: , Rfl:     metoprolol tartrate (LOPRESSOR) 50 mg tablet, Take 1 tablet by mouth 2 (two) times a day, Disp: , Rfl:     Pyridoxine HCl (VITAMIN B-6 PO), Take by mouth, Disp: , Rfl:     lactobacillus acidophilus, Take by mouth, Disp: , Rfl:       Allergies:  No Known Allergies      Assessment and Plan:  Please see PCP regarding COPD management    1  CAD S/P percutaneous coronary angioplasty  Stable  Continue aspirin, statin, beta-blocker     2  Essential hypertension  BP at goal   Continue current medications     3  Mixed hyperlipidemia  Continue statin and diet control  Her PCP closely monitors her blood work     4  Non-rheumatic aortic stenosis  Stable  Asymptomatic  Follow-up with serial echocardiograms     5  Nonrheumatic aortic valve insufficiency  Stable  Asymptomatic  Follow-up with serial echocardiograms     6  Arteriosclerosis of both carotid arteries  Continue aspirin, Plavix, statin  Follow-up with vascular surgeon     7  PAD (peripheral artery disease) (HCC)  Continue aspirin, Plavix, statin  Follow-up with vascular surgeon     8  Varicose veins of both lower extremities  Keep legs elevated while in bed  MARIA DE JESUS stockings in daytime  Low salt diet     9  Tobacco abuse  Strongly counseled to stop smoking     Recommend aggressive risk factor modification and therapeutic lifestyle changes  Low-salt, low-calorie, low-fat, low-cholesterol diet with regular exercise and to optimize weight  I will defer the ordering and monitoring of necessity lab studies to you, but I am available and happy to review and manage any of the data at your request in the future      Discussed concepts of atherosclerosis, including signs and symptoms of cardiac disease  Previous studies were reviewed  Safety measures were reviewed  Questions were entertained and answered  Patient was advised to report any problems requiring medical attention  Follow-up with PCP and appropriate specialist and lab work as discussed  Return for follow up visit as scheduled or earlier, if needed  Thank you for allowing me to participate in the care and evaluation of your patient  Should you have any questions, please feel free to contact me        Geoff Ortiz MD  10/30/2018,10:31 AM

## 2019-02-11 ENCOUNTER — TRANSCRIBE ORDERS (OUTPATIENT)
Dept: NEUROLOGY | Facility: CLINIC | Age: 74
End: 2019-02-11

## 2019-02-11 DIAGNOSIS — H53.10 SUBJECTIVE VISUAL DISTURBANCES: Primary | ICD-10-CM

## 2019-02-15 ENCOUNTER — TELEPHONE (OUTPATIENT)
Dept: NEUROLOGY | Facility: CLINIC | Age: 74
End: 2019-02-15

## 2019-04-11 ENCOUNTER — TELEPHONE (OUTPATIENT)
Dept: GASTROENTEROLOGY | Facility: CLINIC | Age: 74
End: 2019-04-11

## 2019-05-07 ENCOUNTER — TELEPHONE (OUTPATIENT)
Dept: CARDIOLOGY CLINIC | Facility: CLINIC | Age: 74
End: 2019-05-07

## 2019-05-13 ENCOUNTER — TELEPHONE (OUTPATIENT)
Dept: CARDIOLOGY CLINIC | Facility: CLINIC | Age: 74
End: 2019-05-13

## 2019-05-16 ENCOUNTER — OFFICE VISIT (OUTPATIENT)
Dept: CARDIOLOGY CLINIC | Facility: CLINIC | Age: 74
End: 2019-05-16
Payer: MEDICARE

## 2019-05-16 VITALS
SYSTOLIC BLOOD PRESSURE: 110 MMHG | WEIGHT: 136 LBS | HEIGHT: 60 IN | BODY MASS INDEX: 26.7 KG/M2 | HEART RATE: 102 BPM | DIASTOLIC BLOOD PRESSURE: 58 MMHG | OXYGEN SATURATION: 96 %

## 2019-05-16 DIAGNOSIS — R06.02 SHORTNESS OF BREATH: ICD-10-CM

## 2019-05-16 DIAGNOSIS — I10 ESSENTIAL HYPERTENSION: Primary | ICD-10-CM

## 2019-05-16 DIAGNOSIS — I25.10 CORONARY ARTERY DISEASE INVOLVING NATIVE CORONARY ARTERY OF NATIVE HEART WITHOUT ANGINA PECTORIS: ICD-10-CM

## 2019-05-16 DIAGNOSIS — F17.200 SMOKING: ICD-10-CM

## 2019-05-16 DIAGNOSIS — I50.32 CHRONIC DIASTOLIC CONGESTIVE HEART FAILURE (HCC): ICD-10-CM

## 2019-05-16 PROCEDURE — 99214 OFFICE O/P EST MOD 30 MIN: CPT | Performed by: INTERNAL MEDICINE

## 2019-05-16 RX ORDER — PREDNISONE 10 MG/1
10 TABLET ORAL DAILY
COMMUNITY
End: 2019-05-16 | Stop reason: ALTCHOICE

## 2019-05-16 RX ORDER — TORSEMIDE 10 MG/1
10 TABLET ORAL 2 TIMES DAILY
COMMUNITY
End: 2020-01-21

## 2019-05-17 ENCOUNTER — TELEPHONE (OUTPATIENT)
Dept: CARDIOLOGY CLINIC | Facility: CLINIC | Age: 74
End: 2019-05-17

## 2019-07-17 ENCOUNTER — TELEPHONE (OUTPATIENT)
Dept: CARDIOLOGY CLINIC | Facility: CLINIC | Age: 74
End: 2019-07-17

## 2019-07-17 NOTE — TELEPHONE ENCOUNTER
Pt called and would like to know if there is any rule about drinking grapefruit juice with medications  Or is there a time period before or after taking medications that she should not drink grapefruit juice  Pt would like a call back   [phone]

## 2019-07-17 NOTE — TELEPHONE ENCOUNTER
S/w pt and she verbally understood per Dr Jeyson Galo she is to avoid grapejuice with her medication

## 2019-09-19 ENCOUNTER — TELEPHONE (OUTPATIENT)
Dept: CARDIOLOGY CLINIC | Facility: CLINIC | Age: 74
End: 2019-09-19

## 2019-09-19 NOTE — TELEPHONE ENCOUNTER
Patient can hold aspirin for 5 days from my side  Plavix is being prescribed by vascular surgeon    So need to confirm from him/her

## 2019-09-19 NOTE — TELEPHONE ENCOUNTER
Dr Veronica Casarez,   Dr Wesley Mccray saw pt on 05/16/2019 for a hosp fup    Pt is getting a FNA right lower lobe biopsy how many day to hold plavix 75mg and asp  81  They ( [facility])   are asking for 5 days

## 2019-11-05 ENCOUNTER — TELEPHONE (OUTPATIENT)
Dept: CARDIOLOGY CLINIC | Facility: CLINIC | Age: 74
End: 2019-11-05

## 2019-11-05 NOTE — TELEPHONE ENCOUNTER
Pt called and stated that she has been having trouble with her bp being very low  Pt has to start going for her infusion appointments  Pt was told she is not able to start unless her bp is controlled  Pt would like a call back discuss

## 2019-11-05 NOTE — TELEPHONE ENCOUNTER
Pt informed me she is due to have Immunotherapy on 11/7/19 and her bp is still low  It was 99/43 and 2 weeks prior to that 104/43  She said 2 months ago she stopped taking her evening metoprolol and has only been taking it in the AM  She was told if she cannot get her BP higher she cannot have the immunotherapy on the 7th  Please advise

## 2019-11-05 NOTE — TELEPHONE ENCOUNTER
Decrease metoprolol to 25 mg twice daily AND  Decrease lisinopril to half the dose she is currently on (but once a day)

## 2019-11-06 NOTE — TELEPHONE ENCOUNTER
Spoke with the pt she stated she is on the lowest dose of lisinopril  she is taking lisinoprol 5 mg daily  She will decrease metoprolol to 25 mg 2x daily   But stated if you want her to snap her 5mg in half and take 2 5 mg lisinopril daily?

## 2019-11-07 NOTE — TELEPHONE ENCOUNTER
S/w pt and she verbally understood per Dr Erickson Robertson  She will be cutting in half her lisinopril dose as she has many and wants to use them up

## 2019-11-11 DIAGNOSIS — I10 ESSENTIAL HYPERTENSION: Primary | ICD-10-CM

## 2019-11-11 RX ORDER — LISINOPRIL 2.5 MG/1
2.5 TABLET ORAL DAILY
Qty: 90 TABLET | Refills: 2 | Status: SHIPPED | OUTPATIENT
Start: 2019-11-11 | End: 2020-01-21

## 2019-11-11 RX ORDER — LISINOPRIL 2.5 MG/1
2.5 TABLET ORAL DAILY
Qty: 90 TABLET | Refills: 3 | Status: CANCELLED | OUTPATIENT
Start: 2019-11-11

## 2019-11-11 NOTE — TELEPHONE ENCOUNTER
Pt needs 2 5mg script sent in for the Lisinopril because her pills are round so they will not break in half easily   Please send 90 day supply [facility] in [place]

## 2020-01-21 ENCOUNTER — OFFICE VISIT (OUTPATIENT)
Dept: CARDIOLOGY CLINIC | Facility: CLINIC | Age: 75
End: 2020-01-21
Payer: MEDICARE

## 2020-01-21 VITALS
HEIGHT: 57 IN | OXYGEN SATURATION: 96 % | SYSTOLIC BLOOD PRESSURE: 116 MMHG | BODY MASS INDEX: 30.42 KG/M2 | WEIGHT: 141 LBS | HEART RATE: 100 BPM | DIASTOLIC BLOOD PRESSURE: 60 MMHG

## 2020-01-21 DIAGNOSIS — Z72.0 TOBACCO ABUSE: ICD-10-CM

## 2020-01-21 DIAGNOSIS — I35.1 NONRHEUMATIC AORTIC VALVE INSUFFICIENCY: ICD-10-CM

## 2020-01-21 DIAGNOSIS — I25.10 CAD S/P PERCUTANEOUS CORONARY ANGIOPLASTY: Primary | ICD-10-CM

## 2020-01-21 DIAGNOSIS — E78.2 MIXED HYPERLIPIDEMIA: ICD-10-CM

## 2020-01-21 DIAGNOSIS — I83.93 ASYMPTOMATIC VARICOSE VEINS OF BOTH LOWER EXTREMITIES: ICD-10-CM

## 2020-01-21 DIAGNOSIS — Z98.61 CAD S/P PERCUTANEOUS CORONARY ANGIOPLASTY: Primary | ICD-10-CM

## 2020-01-21 DIAGNOSIS — I65.23 ARTERIOSCLEROSIS OF BOTH CAROTID ARTERIES: ICD-10-CM

## 2020-01-21 DIAGNOSIS — I35.0 NON-RHEUMATIC AORTIC STENOSIS: ICD-10-CM

## 2020-01-21 DIAGNOSIS — I73.9 PAD (PERIPHERAL ARTERY DISEASE) (HCC): ICD-10-CM

## 2020-01-21 DIAGNOSIS — I10 ESSENTIAL HYPERTENSION: ICD-10-CM

## 2020-01-21 PROCEDURE — 99214 OFFICE O/P EST MOD 30 MIN: CPT | Performed by: INTERNAL MEDICINE

## 2020-01-21 RX ORDER — PROCHLORPERAZINE MALEATE 10 MG
10 TABLET ORAL EVERY 6 HOURS PRN
COMMUNITY

## 2020-01-21 RX ORDER — BENZONATATE 100 MG/1
100 CAPSULE ORAL 3 TIMES DAILY PRN
COMMUNITY

## 2020-01-21 NOTE — PROGRESS NOTES
CARDIOLOGY OFFICE VISIT  [facility]  [address], [facility], [address] [facility], [place], [facility] Philip Vicente  Tel: [phone]      NAME: Bonnie Coelho  AGE: 69 y o  SEX: female  : 1945   MRN: [id number]      Chief Complaint:  Chief Complaint   Patient presents with    Follow-up     2 months    Leg Swelling     left leg and ankle         History of Present Illness:   Patient comes for follow-up  States she was recently diagnosed with lung cancer and has started chemotherapy for it  States she is trying to quit smoking  Her shortness of breath is at baseline - She notes chronic unchanged non compromising shortness of breath on exertion-she has a history of COPD and feels it is stable  Patient denies chest pain, palpitations, syncope, swelling feet, PND, claudication  States her recent BP has been low and she feels lightheaded sometimes  CAD s/p RCA stent  -  States is doing well cardiac wise with no cardiac symptoms  Taking all medications (ASA, statin, BB, ACEI) regularly  Has not used SL NTG since the last clinic visit  HTN -  Has been hypertensive for many years  Recent BPs have been low despite decreasing her dose of metoprolol (75 mg bid to 25 mg bid) and lisinopril (5 mg to 2 5 mg)  Taking medications regularly  Denies lightheadedness, headache, medication side effects  HLP -  Has had hyperlipidemia for many years  Taking statin regularly along with diet control  Denies myalgia  PCP closely monitoring the blood work  AS, AI - stable  Asymptomatic     Carotid arteriosclerosis bilateral - S/P right carotid endarterectomy   On aspirin, Plavix, statin  Follows with vascular surgeon     PAD - S/P stents in her left iliac and femoral arteries in 2016  On aspirin, Plavix, statin  Follows with vascular surgeon     Bilateral varicose veins in the legs   No swelling currently     Tobacco abuse - continues to smoke despite rptd smoking cessation counseling        Past Medical History:  Past Medical History:   Diagnosis Date    Aortic stenosis     Aortic stenosis     Coronary arteriosclerosis     Coronary arteriosclerosis     Coronary artery disease     Coronary artery sclerosis     Diabetes mellitus ([address] )     Hyperlipidemia     Hypertension     Obesity     Occlusion of carotid artery     Peripheral artery disease (HCC)          Past Surgical History:  Past Surgical History:   Procedure Laterality Date    APPENDECTOMY      CHOLECYSTECTOMY      CORONARY STENT PLACEMENT           Family History:  Family History   Problem Relation Age of Onset    Coronary artery disease Neg Hx     Diabetes Neg Hx          Social History:  Social History     Socioeconomic History    Marital status: /Civil Union     Spouse name: None    Number of children: None    Years of education: None    Highest education level: None   Occupational History    None   Social Needs    Financial resource strain: None    Food insecurity:     Worry: None     Inability: None    Transportation needs:     Medical: None     Non-medical: None   Tobacco Use    Smoking status: Current Every Day Smoker    Smokeless tobacco: Never Used   Substance and Sexual Activity    Alcohol use: No    Drug use: None    Sexual activity: None   Lifestyle    Physical activity:     Days per week: None     Minutes per session: None    Stress: None   Relationships    Social connections:     Talks on phone: None     Gets together: None     Attends Baptism service: None     Active member of club or organization: None     Attends meetings of clubs or organizations: None     Relationship status: None    Intimate partner violence:     Fear of current or ex partner: None     Emotionally abused: None     Physically abused: None     Forced sexual activity: None   Other Topics Concern    None   Social History Narrative    None         Active Problems:  Patient Active Problem List   Diagnosis    CAD S/P percutaneous coronary angioplasty    Essential hypertension    Mixed hyperlipidemia    Non-rheumatic aortic stenosis    Nonrheumatic aortic valve insufficiency    PAD (peripheral artery disease) (HCC)    Smoking    Varicose veins of both lower extremities    Arteriosclerosis of both carotid arteries    Coronary artery disease involving native coronary artery of native heart without angina pectoris    Shortness of breath    Chronic diastolic congestive heart failure (HCC)         The following portions of the patient's history were reviewed and updated as appropriate: past medical history, past surgical history, past family history,  past social history, current medications, allergies and problem list     Review of Systems:  Constitutional: Denies fever, chills, fatigue  Eyes: Denies eye redness, eye discharge, double vision  ENT: Denies hearing loss, tinnitus, sneezing, nasal discharge, sore throat   Respiratory: +cough, +shortness of breath  Cardiovascular: Denies chest pain, palpitations, lower extremity swelling  Gastrointestinal: Denies abdominal pain, nausea, vomiting, hematemesis, diarrhea, bloody stools  Genito-Urinary: Denies dysuria, incontinence  Musculoskeletal: Denies back pain  Neurologic: Denies confusion, lightheadedness, syncope, headache, focal weakness, sensory changes, seizures  Endocrine: Denies polyuria, polydipsia, temperature intolerance  Allergy and Immunology: Denies hives, insect bite sensitivity  Hematological and Lymphatic: Denies bleeding problems, swollen glands   Psychological: Denies suicidal ideation, anxiety, panic  Dermatological: Denies pruritus, rash, skin lesion changes      Vitals:  Vitals:    01/21/20 0859   BP: 116/60   Pulse: 100   SpO2: 96%       Body mass index is 30 51 kg/m²      Weight (last 2 days)     Date/Time   Weight    01/21/20 0859   64 (141)              Physical Examination:  General: Patient is not in acute distress  Awake, alert, oriented in time, place and person  Responding to commands  Head: Normocephalic  Atraumatic  Eyes: Nonicteric  ENT: Normal external ear canals  Nares normal, no drainage  Lips and oral mucosa normal  Neck: Supple  JVP not raised  Trachea central  No thyromegaly  Lungs: Bilateral bronchovascular breath sounds  Chest wall: No tenderness  Cardiovascular: RRR  S1 and S2 normal  Grade 2/6 KOFI at base  Gastrointestinal: Abdomen soft, nontender  No guarding or rigidity  Bowel sounds present  Neurologic: Patient is awake, alert, oriented in time, place and person  Responding to command  Moving all extremities  Integumentary:  No skin rash  Lymphatic: No cervical lymphadenopathy  Back: Symmetric  No CVA tenderness  Extremities: No clubbing, cyanosis or edema      Laboratory Results:  Cardiac testing:   Results for orders placed during the hospital encounter of 06/15/16   Echo complete with contrast if indicated    Narrative [place] [address], [address] [place] [address]  [phone]    Transthoracic Echocardiogram  2D, M-mode, and Color Doppler    Study date:  15-Curtis-2016    Patient: Zafar Hay  MR number: [id number]  Account number: [de-identified]  : [id number]  Age: 72 years  Gender: Female  Status: Outpatient  Location: [facility]  Height: 60 in  Weight: 161 lb  BP: 122/ 60 mmHg    Indications: CAD  Diagnoses: I25 10 - Atherosclerotic heart disease of native coronary artery  without angina pectoris    Sonographer:  Chase RCS  Primary Physician:  Phong Fuentes DO  Referring Physician:  Oralia Canales MD  Group:  [facility]  Interpreting Physician:  Oralia Canales MD    SUMMARY    LEFT VENTRICLE:  There were no regional wall motion abnormalities  There was mild concentric hypertrophy  Doppler parameters were consistent with high ventricular filling pressure      LEFT ATRIUM:  The atrium was mildly dilated  MITRAL VALVE:  There was moderate annular calcification  There was mild regurgitation  AORTIC VALVE:  There was very mild stenosis  There was mild regurgitation  TRICUSPID VALVE:  There was trace regurgitation  AORTA:  The root exhibited normal size and mild fibrocalcific change  COMPARISONS:  no significant change from 2015    HISTORY: PRIOR HISTORY: CAD  Risk factors: hypertension, insulin-controlled  diabetes, oral hypoglycemic-treated diabetes, medication-treated  hypercholesterolemia, morbid obesity, a history of current cigarette use  (within the last month), and a family history of coronary artery disease  Chronic lung disease  PRIOR PROCEDURES: Diagnostic cath  Stent  PROCEDURE: The study was performed in the [address] [place] [address]  This  was a routine study  The transthoracic approach was used  The study included  complete 2D imaging, M-mode, and color Doppler  The heart rate was 70 bpm, at  the start of the study  Images were obtained from the parasternal, apical,  subcostal, and suprasternal notch acoustic windows  Image quality was adequate  LEFT VENTRICLE: Size was normal  Systolic function was by visual assessment  Ejection fraction was estimated to be 65 %  There were no regional wall motion  abnormalities  There was mild concentric hypertrophy  DOPPLER: Doppler  parameters were consistent with high ventricular filling pressure  RIGHT VENTRICLE: The size was normal  Systolic function was normal  Wall  thickness was normal     LEFT ATRIUM: The atrium was mildly dilated  RIGHT ATRIUM: Size was normal     MITRAL VALVE: There was moderate annular calcification  DOPPLER: There was mild  regurgitation  AORTIC VALVE: DOPPLER: There was very mild stenosis  There was mild  regurgitation  TRICUSPID VALVE: DOPPLER: There was trace regurgitation  PERICARDIUM: There was no pericardial effusion  The pericardium was normal in  appearance      AORTA: The root exhibited normal size and mild fibrocalcific change  PULMONARY ARTERY: DOPPLER: Systolic pressure was within the normal range  SYSTEM MEASUREMENT TABLES    Apical four chamber  4 chamber Left Atrium Volume Index; Planimetry; End Systole; Apical four  chamber;: 14 2 cm2 Left Ventricular Diastolic Area; Method of Disks, Single  Plane; End Diastole; Apical four chamber;: 22 54 cm2 Left Ventricular Ejection  Fraction; Method of Disks, Single Plane; Apical four chamber;: 68 % Left  Ventricular systolic Area; Method of Disks, Single Plane; End Systole; Apical  four chamber;: 11 68 cm2 Right Atrium Systolic Area; Planimetry; End Systole; Apical four chamber;: 12 25 cm2 Right Ventricular Internal Diastolic Dimension; End Diastole; Apical four chamber;: 32 2 mm    Unspecified Scan Mode  Aortic Root Diameter; End Systole;: 28 5 mm  Aortic valve Area; Continuity Equation by Velocity Time Integral; Systole;:  1 42 cm2  Cardiovascular Orifice Diameter; End Systole;: 18 6 mm  Gradient Pressure, Peak; Simplified Bernoulli; Antegrade Flow; Systole;: 16 1  mm[Hg] Gradient pressure, average; Simplified Bernoulli; Antegrade Flow;  Systole;: 8 mm[Hg]  Left atrial diameter; End Diastole;: 39 9 mm  Cardiac Output; Teichholz; Systole;: 3 42 L/min  Heart rate; Teichholz;: 70 /min  Interventricular Septum Diastolic Thickness; Teichholz; End Diastole;: 11 5 mm  Left Ventricle Internal End Diastolic Dimension; Teichholz;: 39 8 mm  Left Ventricle Internal Systolic Dimension; Teichholz; End Systole;: 24 1 mm  Left Ventricle Mass; Mass AVCube with Teichholz; End Diastole;: 144 g  Left Ventricle Posterior Wall Diastolic Thickness; Teichholz; End Diastole;:  10 4 mm  Left Ventricular Ejection Fraction; Teichholz;: 70 5 %  Left Ventricular End Diastolic Volume; Teichholz;: 69 2 ml  Left Ventricular End Systolic Volume; Teichholz;: 20 4 ml  Left Ventricular Fractional Shortening;: 39 4 %  Stroke volume;  Teichholz; Systole;: 48 8 ml  Gradient Pressure, Peak; Simplified Bernoulli; Antegrade Flow; Diastole;: 6 7  mm[Hg] Gradient pressure, average; Simplified Bernoulli;  Antegrade Flow;  Diastole;: 2 8 mm[Hg]  Mitral Valve Area; Area by Pressure Half-Time; Systole;: 2 18 cm2  Mitral Valve E to A Ratio; Systole;: 0 95  Maximum Tricuspid valve regurgitation pressure gradient; Regurgitant Flow;  Systole;: 20 6 mm[Hg]    [facility] Accredited Echocardiography Laboratory    Prepared and electronically signed by    Sherri Bacon MD  Signed 15-Curtis-2016 13:20:51         Medications:    Current Outpatient Medications:     aspirin 81 MG tablet, Take 1 tablet by mouth daily, Disp: , Rfl:     atorvastatin (LIPITOR) 20 mg tablet, Take 1 tablet by mouth daily, Disp: , Rfl:     benzonatate (TESSALON PERLES) 100 mg capsule, Take 100 mg by mouth 3 (three) times a day as needed for cough, Disp: , Rfl:     clopidogrel (PLAVIX) 75 mg tablet, Take 1 tablet by mouth daily, Disp: , Rfl:     glipiZIDE (GLUCOTROL) 5 mg tablet, Take 1 tablet by mouth 3 (three) times a day, Disp: , Rfl:     glucose blood test strip, 1 each by Other route as needed Use as instructed, Disp: , Rfl:     Insulin Glargine (BASAGLAR KWIKPEN SC), Inject 23 Units under the skin daily at bedtime, Disp: , Rfl:     IPRATROPIUM BROMIDE HFA IN, Inhale every 6 (six) hours as needed, Disp: , Rfl:     lactobacillus acidophilus, Take by mouth daily , Disp: , Rfl:     lisinopril (ZESTRIL) 2 5 mg tablet, Take 1 tablet (2 5 mg total) by mouth daily, Disp: 90 tablet, Rfl: 2    metFORMIN (GLUCOPHAGE) 1000 MG tablet, Take 1 tablet by mouth 2 (two) times a day, Disp: , Rfl:     PEMBROLIZUMAB IV, Infuse into a venous catheter, Disp: , Rfl:     prochlorperazine (COMPAZINE) 10 mg tablet, Take 10 mg by mouth every 6 (six) hours as needed for nausea or vomiting, Disp: , Rfl:     pyridoxine (VITAMIN B6) 100 mg tablet, Take 2 tablets by mouth 2 (two) times a day , Disp: , Rfl:     torsemide (DEMADEX) 10 mg tablet, Take 10 mg by mouth 2 (two) times a day, Disp: , Rfl:     Metoprolol Tartrate 75 MG TABS, Take 1 tablet by mouth 2 (two) times a day , Disp: , Rfl:       Allergies:  No Known Allergies      Assessment and Plan:  1  CAD S/P percutaneous coronary angioplasty  Stable  Continue aspirin, statin, beta-blocker     2  Essential hypertension  BP tending to be soft despite medication adjustment  Discontinue lisinopril and torsemide for now     3  Mixed hyperlipidemia  Continue statin and diet control  Her PCP closely monitors her blood work     4  Non-rheumatic aortic stenosis  Stable  Asymptomatic  Follow-up with serial echocardiograms     5  Nonrheumatic aortic valve insufficiency  Stable  Asymptomatic  Follow-up with serial echocardiograms     6  Arteriosclerosis of both carotid arteries  Continue aspirin, Plavix, statin  Follow-up with vascular surgeon     7  PAD (peripheral artery disease) (HCC)  Continue aspirin, Plavix, statin  Follow-up with vascular surgeon     8  Varicose veins of both lower extremities  Keep legs elevated while in bed  MARIA DE JESUS stockings in daytime  Low salt diet     9  Tobacco abuse  Strongly counseled to stop smoking     Recommend aggressive risk factor modification and therapeutic lifestyle changes  Low-salt, low-calorie, low-fat, low-cholesterol diet with regular exercise and to optimize weight  I will defer the ordering and monitoring of necessity lab studies to you, but I am available and happy to review and manage any of the data at your request in the future  Discussed concepts of atherosclerosis, including signs and symptoms of cardiac disease  Previous studies were reviewed  Safety measures were reviewed  Questions were entertained and answered  Patient was advised to report any problems requiring medical attention  Follow-up with PCP and appropriate specialist and lab work as discussed      Return for follow up visit as scheduled or earlier, if needed  Thank you for allowing me to participate in the care and evaluation of your patient  Should you have any questions, please feel free to contact me        Charbel Palmer MD  1/21/2020,10:49 AM

## 2020-03-12 ENCOUNTER — TELEPHONE (OUTPATIENT)
Dept: CARDIOLOGY CLINIC | Facility: CLINIC | Age: 75
End: 2020-03-12

## 2020-06-05 ENCOUNTER — TELEPHONE (OUTPATIENT)
Dept: CARDIOLOGY CLINIC | Facility: CLINIC | Age: 75
End: 2020-06-05

## 2020-07-07 ENCOUNTER — OFFICE VISIT (OUTPATIENT)
Dept: CARDIOLOGY CLINIC | Facility: CLINIC | Age: 75
End: 2020-07-07
Payer: MEDICARE

## 2020-07-07 VITALS
SYSTOLIC BLOOD PRESSURE: 120 MMHG | WEIGHT: 131 LBS | HEIGHT: 57 IN | HEART RATE: 93 BPM | BODY MASS INDEX: 28.26 KG/M2 | DIASTOLIC BLOOD PRESSURE: 60 MMHG | OXYGEN SATURATION: 95 %

## 2020-07-07 DIAGNOSIS — I65.23 ARTERIOSCLEROSIS OF BOTH CAROTID ARTERIES: ICD-10-CM

## 2020-07-07 DIAGNOSIS — I83.93 ASYMPTOMATIC VARICOSE VEINS OF BOTH LOWER EXTREMITIES: ICD-10-CM

## 2020-07-07 DIAGNOSIS — I73.9 PAD (PERIPHERAL ARTERY DISEASE) (HCC): ICD-10-CM

## 2020-07-07 DIAGNOSIS — Z72.0 TOBACCO ABUSE: ICD-10-CM

## 2020-07-07 DIAGNOSIS — Z98.61 CAD S/P PERCUTANEOUS CORONARY ANGIOPLASTY: Primary | ICD-10-CM

## 2020-07-07 DIAGNOSIS — E78.2 MIXED HYPERLIPIDEMIA: ICD-10-CM

## 2020-07-07 DIAGNOSIS — I25.10 CAD S/P PERCUTANEOUS CORONARY ANGIOPLASTY: Primary | ICD-10-CM

## 2020-07-07 DIAGNOSIS — I10 ESSENTIAL HYPERTENSION: ICD-10-CM

## 2020-07-07 DIAGNOSIS — I35.0 NON-RHEUMATIC AORTIC STENOSIS: ICD-10-CM

## 2020-07-07 DIAGNOSIS — I35.1 NONRHEUMATIC AORTIC VALVE INSUFFICIENCY: ICD-10-CM

## 2020-07-07 PROCEDURE — 99214 OFFICE O/P EST MOD 30 MIN: CPT | Performed by: INTERNAL MEDICINE

## 2020-07-07 RX ORDER — PEN NEEDLE, DIABETIC 32GX 5/32"
NEEDLE, DISPOSABLE MISCELLANEOUS
COMMUNITY
Start: 2020-06-20

## 2020-07-07 RX ORDER — TORSEMIDE 10 MG/1
TABLET ORAL
COMMUNITY
Start: 2020-06-12

## 2020-07-07 RX ORDER — CLOPIDOGREL BISULFATE 75 MG/1
75 TABLET ORAL DAILY
Qty: 90 TABLET | Refills: 3 | Status: CANCELLED | OUTPATIENT
Start: 2020-07-07

## 2020-07-07 RX ORDER — LISINOPRIL 10 MG/1
12.5 TABLET ORAL DAILY
COMMUNITY
Start: 2019-06-11

## 2020-07-07 RX ORDER — INSULIN GLARGINE 100 [IU]/ML
INJECTION, SOLUTION SUBCUTANEOUS
COMMUNITY
Start: 2020-07-02

## 2020-07-07 NOTE — PROGRESS NOTES
CARDIOLOGY OFFICE VISIT  [facility]  [address], [place], [facility] Philip Vicente  Tel: [phone]      NAME: Leandro Luong  AGE: 69 y o  SEX: female  : 1945   MRN: [id number]      Chief Complaint:  Chief Complaint   Patient presents with    Follow-up         History of Present Illness:   Patient comes for follow-up  States she is doing reasonably okay from cardiac standpoint  Her shortness of breath is at baseline (pt has COPD)  Denies chest pain, palpitations, syncope, swelling feet, PND, claudication  States she was recently diagnosed with lung cancer and has started chemotherapy for it  States she is trying to quit smoking  CAD s/p RCA stent  -  States is doing well cardiac wise with no cardiac symptoms  Taking all medications (ASA, statin, BB, ACEI) regularly  Has not used SL NTG since the last clinic visit  HTN -  Has been hypertensive for many years  Recent BPs have been low despite decreasing her dose of metoprolol (75 mg bid to 25 mg bid) and lisinopril (5 mg to 2 5 mg)  Taking medications regularly  Denies lightheadedness, headache, medication side effects  HLP -  Has had hyperlipidemia for many years  Taking statin regularly along with diet control  Denies myalgia  PCP closely monitoring the blood work  AS, AI - stable  Asymptomatic     Carotid arteriosclerosis bilateral - S/P right carotid endarterectomy   On aspirin, Plavix, statin  Follows with vascular surgeon     PAD - S/P stents in her left iliac and femoral arteries in 2016  On aspirin, Plavix, statin  Follows with vascular surgeon     Bilateral varicose veins in the legs   No swelling currently     Tobacco abuse - continues to smoke despite rptd smoking cessation counseling        Past Medical History:  Past Medical History:   Diagnosis Date    Aortic stenosis     Aortic stenosis     Coronary arteriosclerosis  Coronary arteriosclerosis     Coronary artery disease     Coronary artery sclerosis     Diabetes mellitus ([facility] [address] )     Hyperlipidemia     Hypertension     Obesity     Occlusion of carotid artery     Peripheral artery disease (HCC)          Past Surgical History:  Past Surgical History:   Procedure Laterality Date    APPENDECTOMY      CHOLECYSTECTOMY      CORONARY STENT PLACEMENT           Family History:  Family History   Problem Relation Age of Onset    Coronary artery disease Neg Hx     Diabetes Neg Hx          Social History:  Social History     Socioeconomic History    Marital status: /Civil Union     Spouse name: None    Number of children: None    Years of education: None    Highest education level: None   Occupational History    None   Social Needs    Financial resource strain: None    Food insecurity:     Worry: None     Inability: None    Transportation needs:     Medical: None     Non-medical: None   Tobacco Use    Smoking status: Current Every Day Smoker    Smokeless tobacco: Never Used   Substance and Sexual Activity    Alcohol use: No    Drug use: None    Sexual activity: None   Lifestyle    Physical activity:     Days per week: None     Minutes per session: None    Stress: None   Relationships    Social connections:     Talks on phone: None     Gets together: None     Attends Worship service: None     Active member of club or organization: None     Attends meetings of clubs or organizations: None     Relationship status: None    Intimate partner violence:     Fear of current or ex partner: None     Emotionally abused: None     Physically abused: None     Forced sexual activity: None   Other Topics Concern    None   Social History Narrative    None         Active Problems:  Patient Active Problem List   Diagnosis    CAD S/P percutaneous coronary angioplasty    Essential hypertension    Mixed hyperlipidemia    Non-rheumatic aortic stenosis    Nonrheumatic aortic valve insufficiency    PAD (peripheral artery disease) (HCC)    Smoking    Varicose veins of both lower extremities    Arteriosclerosis of both carotid arteries    Coronary artery disease involving native coronary artery of native heart without angina pectoris    Shortness of breath    Chronic diastolic congestive heart failure (HCC)         The following portions of the patient's history were reviewed and updated as appropriate: past medical history, past surgical history, past family history,  past social history, current medications, allergies and problem list       Review of Systems:  Constitutional: Denies fever, chills  Eyes: Denies eye redness, eye discharge  ENT: Denies hearing loss, tinnitus, sneezing, nasal discharge, sore throat   Respiratory: +cough, +shortness of breath  Cardiovascular: Denies chest pain, palpitations, orthopnea, PND, lower extremity swelling  Gastrointestinal: Denies abdominal pain, nausea, vomiting, hematemesis, diarrhea, bloody stools  Genito-Urinary: Denies dysuria, incontinence  Musculoskeletal: Denies back pain, joint pain, muscle pain  Neurologic: Denies confusion, lightheadedness, syncope, headache, focal weakness, sensory changes, seizures  Endocrine: Denies polyuria, polydipsia, temperature intolerance  Allergy and Immunology: Denies hives, insect bite sensitivity  Hematological and Lymphatic: Denies bleeding problems, swollen glands   Psychological: Denies depression, suicidal ideation, anxiety, panic  Dermatological: Denies pruritus, rash, skin lesion changes      Vitals:  Vitals:    07/07/20 1042   BP: 120/60   Pulse: 93   SpO2: 95%       Body mass index is 28 35 kg/m²  Weight (last 2 days)     Date/Time   Weight    07/07/20 1042   59 4 (131)              Physical Examination:  General: Patient is not in acute distress  Awake, alert, oriented in time, place and person  Responding to commands  Head: Normocephalic  Atraumatic  Eyes:  Both pupils normal sized, round and reactive to light  Nonicteric  ENT: Normal external ear canals  Nares normal, no drainage  Lips and oral mucosa normal  Neck: Supple  JVP not raised  Trachea central  No thyromegaly  Lungs: Bilateral bronchovascular breath sounds with no crackles or rhonchi  Chest wall: No tenderness  Cardiovascular: RRR  S1 and S2 normal  Grade 3/6 KOFI at base+  Gastrointestinal: Abdomen soft, nontender  No guarding or rigidity  Bowel sounds present  Neurologic: Patient is awake, alert, oriented in time, place and person  Responding to command  Moving all extremities  Integumentary:  No skin rash  Lymphatic: No cervical lymphadenopathy  Back: Symmetric  No CVA tenderness  Extremities: No clubbing, cyanosis or edema      Laboratory Results:  Cardiac testing:   Results for orders placed during the hospital encounter of 06/15/16   Echo complete with contrast if indicated    Narrative [place] [address] [address] [place] [address]  [phone]    Transthoracic Echocardiogram  2D, M-mode, and Color Doppler    Study date:  15-Curtis-2016    Patient: Cira Hester  MR number: [id number]  Account number: [de-identified]  : [id number]  Age: 72 years  Gender: Female  Status: Outpatient  Location: [facility]  Height: 60 in  Weight: 161 lb  BP: 122/ 60 mmHg    Indications: CAD  Diagnoses: I25 10 - Atherosclerotic heart disease of native coronary artery  without angina pectoris    Sonographer:  Demarco RCS  Primary Physician:  rGeg Farley DO  Referring Physician:  Rosario Bowen MD  Group:  [facility]  Interpreting Physician:  Rosario Bowen MD    SUMMARY    LEFT VENTRICLE:  There were no regional wall motion abnormalities  There was mild concentric hypertrophy  Doppler parameters were consistent with high ventricular filling pressure  LEFT ATRIUM:  The atrium was mildly dilated  MITRAL VALVE:  There was moderate annular calcification    There was mild regurgitation  AORTIC VALVE:  There was very mild stenosis  There was mild regurgitation  TRICUSPID VALVE:  There was trace regurgitation  AORTA:  The root exhibited normal size and mild fibrocalcific change  COMPARISONS:  no significant change from 2015    HISTORY: PRIOR HISTORY: CAD  Risk factors: hypertension, insulin-controlled  diabetes, oral hypoglycemic-treated diabetes, medication-treated  hypercholesterolemia, morbid obesity, a history of current cigarette use  (within the last month), and a family history of coronary artery disease  Chronic lung disease  PRIOR PROCEDURES: Diagnostic cath  Stent  PROCEDURE: The study was performed in the [address] [place] [address]  This  was a routine study  The transthoracic approach was used  The study included  complete 2D imaging, M-mode, and color Doppler  The heart rate was 70 bpm, at  the start of the study  Images were obtained from the parasternal, apical,  subcostal, and suprasternal notch acoustic windows  Image quality was adequate  LEFT VENTRICLE: Size was normal  Systolic function was by visual assessment  Ejection fraction was estimated to be 65 %  There were no regional wall motion  abnormalities  There was mild concentric hypertrophy  DOPPLER: Doppler  parameters were consistent with high ventricular filling pressure  RIGHT VENTRICLE: The size was normal  Systolic function was normal  Wall  thickness was normal     LEFT ATRIUM: The atrium was mildly dilated  RIGHT ATRIUM: Size was normal     MITRAL VALVE: There was moderate annular calcification  DOPPLER: There was mild  regurgitation  AORTIC VALVE: DOPPLER: There was very mild stenosis  There was mild  regurgitation  TRICUSPID VALVE: DOPPLER: There was trace regurgitation  PERICARDIUM: There was no pericardial effusion  The pericardium was normal in  appearance  AORTA: The root exhibited normal size and mild fibrocalcific change      PULMONARY ARTERY: DOPPLER: Systolic pressure was within the normal range  SYSTEM MEASUREMENT TABLES    Apical four chamber  4 chamber Left Atrium Volume Index; Planimetry; End Systole; Apical four  chamber;: 14 2 cm2 Left Ventricular Diastolic Area; Method of Disks, Single  Plane; End Diastole; Apical four chamber;: 22 54 cm2 Left Ventricular Ejection  Fraction; Method of Disks, Single Plane; Apical four chamber;: 68 % Left  Ventricular systolic Area; Method of Disks, Single Plane; End Systole; Apical  four chamber;: 11 68 cm2 Right Atrium Systolic Area; Planimetry; End Systole; Apical four chamber;: 12 25 cm2 Right Ventricular Internal Diastolic Dimension; End Diastole; Apical four chamber;: 32 2 mm    Unspecified Scan Mode  Aortic Root Diameter; End Systole;: 28 5 mm  Aortic valve Area; Continuity Equation by Velocity Time Integral; Systole;:  1 42 cm2  Cardiovascular Orifice Diameter; End Systole;: 18 6 mm  Gradient Pressure, Peak; Simplified Bernoulli; Antegrade Flow; Systole;: 16 1  mm[Hg] Gradient pressure, average; Simplified Bernoulli; Antegrade Flow;  Systole;: 8 mm[Hg]  Left atrial diameter; End Diastole;: 39 9 mm  Cardiac Output; Teichholz; Systole;: 3 42 L/min  Heart rate; Teichholz;: 70 /min  Interventricular Septum Diastolic Thickness; Teichholz; End Diastole;: 11 5 mm  Left Ventricle Internal End Diastolic Dimension; Teichholz;: 39 8 mm  Left Ventricle Internal Systolic Dimension; Teichholz; End Systole;: 24 1 mm  Left Ventricle Mass; Mass AVCube with Teichholz; End Diastole;: 144 g  Left Ventricle Posterior Wall Diastolic Thickness; Teichholz; End Diastole;:  10 4 mm  Left Ventricular Ejection Fraction; Teichholz;: 70 5 %  Left Ventricular End Diastolic Volume; Teichholz;: 69 2 ml  Left Ventricular End Systolic Volume; Teichholz;: 20 4 ml  Left Ventricular Fractional Shortening;: 39 4 %  Stroke volume; Teichholz; Systole;: 48 8 ml  Gradient Pressure, Peak; Simplified Bernoulli;  Antegrade Flow; Diastole;: 6 7  mm[Hg] Gradient pressure, average; Simplified Bernoulli;  Antegrade Flow;  Diastole;: 2 8 mm[Hg]  Mitral Valve Area; Area by Pressure Half-Time; Systole;: 2 18 cm2  Mitral Valve E to A Ratio; Systole;: 0 95  Maximum Tricuspid valve regurgitation pressure gradient; Regurgitant Flow;  Systole;: 20 6 mm[Hg]    [facility] Accredited Echocardiography Laboratory    Prepared and electronically signed by    Gila Mauro MD  Signed 15-Curtis-2016 13:20:51         Medications:    Current Outpatient Medications:     aspirin 81 MG tablet, Take 1 tablet by mouth daily, Disp: , Rfl:     atorvastatin (LIPITOR) 20 mg tablet, Take 1 tablet by mouth daily, Disp: , Rfl:     BASAGLAR KWIKPEN 100 units/mL injection pen, INJECT 23 UNITS AT BEDTIME, Disp: , Rfl:     BD PEN NEEDLE ANABELLE U/F 32G X 4 MM MISC, Use as directed, Disp: , Rfl:     benzonatate (TESSALON PERLES) 100 mg capsule, Take 100 mg by mouth 3 (three) times a day as needed for cough, Disp: , Rfl:     clopidogrel (PLAVIX) 75 mg tablet, Take 1 tablet by mouth daily, Disp: , Rfl:     glipiZIDE (GLUCOTROL) 5 mg tablet, Take 1 tablet by mouth 3 (three) times a day, Disp: , Rfl:     glucose blood test strip, 1 each by Other route as needed Use as instructed, Disp: , Rfl:     Insulin Glargine (BASAGLAR KWIKPEN SC), Inject 23 Units under the skin daily at bedtime, Disp: , Rfl:     IPRATROPIUM BROMIDE HFA IN, Inhale every 6 (six) hours as needed, Disp: , Rfl:     lactobacillus acidophilus, Take by mouth daily , Disp: , Rfl:     lisinopril (ZESTRIL) 10 mg tablet, 12 5 mg daily, Disp: , Rfl:     metFORMIN (GLUCOPHAGE) 1000 MG tablet, Take 1 tablet by mouth 2 (two) times a day, Disp: , Rfl:     metoprolol tartrate (LOPRESSOR) 25 mg tablet, Take 1 tablet (25 mg total) by mouth every 12 (twelve) hours, Disp: 60 tablet, Rfl: 5    PEMBROLIZUMAB IV, Infuse into a venous catheter, Disp: , Rfl:     pyridoxine (VITAMIN B6) 100 mg tablet, Take 2 tablets by mouth 2 (two) times a day , Disp: , Rfl:     prochlorperazine (COMPAZINE) 10 mg tablet, Take 10 mg by mouth every 6 (six) hours as needed for nausea or vomiting, Disp: , Rfl:     torsemide (DEMADEX) 10 mg tablet, , Disp: , Rfl:       Allergies:  No Known Allergies      Assessment and Plan:  1  CAD S/P percutaneous coronary angioplasty  Stable  Continue aspirin, statin, beta-blocker     2  Essential hypertension  BP normal   Continue current medications     3  Mixed hyperlipidemia  Continue statin and diet control  Her PCP closely monitors her blood work     4  Non-rheumatic aortic stenosis  Stable  Asymptomatic  Follow-up with serial echocardiograms     5  Nonrheumatic aortic valve insufficiency  Stable  Asymptomatic  Follow-up with serial echocardiograms     6  Arteriosclerosis of both carotid arteries  Continue aspirin, Plavix, statin  Follow-up with vascular surgeon     7  PAD (peripheral artery disease) (HCC)  Continue aspirin, Plavix, statin  Follow-up with vascular surgeon     8  Varicose veins of both lower extremities  Keep legs elevated while in bed  MARIA DE JESUS stockings in daytime  Low salt diet     9  Tobacco abuse  Strongly counseled to stop smoking     Recommend aggressive risk factor modification and therapeutic lifestyle changes  Low-salt, low-calorie, low-fat, low-cholesterol diet with regular exercise and to optimize weight  I will defer the ordering and monitoring of necessity lab studies to you, but I am available and happy to review and manage any of the data at your request in the future  Discussed concepts of atherosclerosis, including signs and symptoms of cardiac disease  Previous studies were reviewed  Safety measures were reviewed  Questions were entertained and answered  Patient was advised to report any problems requiring medical attention  Follow-up with PCP and appropriate specialist and lab work as discussed      Return for follow up visit as scheduled or earlier, if needed  Thank you for allowing me to participate in the care and evaluation of your patient  Should you have any questions, please feel free to contact me        Mary Becerra MD  7/7/2020,11:56 AM

## 2020-07-21 ENCOUNTER — TELEPHONE (OUTPATIENT)
Dept: CARDIOLOGY CLINIC | Facility: CLINIC | Age: 75
End: 2020-07-21

## 2020-07-21 NOTE — TELEPHONE ENCOUNTER
Spoke with patient and advised her that Dr Ghulam Gan said it is fine for her to hold baby aspirin 5 days prior to procedure  Pt verbally understood

## 2020-07-29 ENCOUNTER — TELEPHONE (OUTPATIENT)
Dept: CARDIOLOGY CLINIC | Facility: CLINIC | Age: 75
End: 2020-07-29

## 2020-07-29 NOTE — TELEPHONE ENCOUNTER
Patient called  She said she saw Dr Jude Moreno in July  He had ordered an Echo in January that she did not have done b/c she had one done at [facility] on 12/4/19  Dr Jessica Clay ordered it  I rescaned it in chart (under media)  She did schedule Echo you ordered in January for August but she is worried that Medicare will not cover it b/c it has only been 7 mths  pls review Echo and if patient still needs the one scheduled for August she will call Medicare  Home # to speak w/ patient  I also put a copy of Echo on Dr Jude Moreno desk

## 2020-08-03 NOTE — TELEPHONE ENCOUNTER
Spoke with patient and advised her that Dr Rosalinda Mckinley said she can have the echo done 1 year after her previous one  Pt verbally understood

## 2020-09-18 DIAGNOSIS — I25.10 CAD S/P PERCUTANEOUS CORONARY ANGIOPLASTY: ICD-10-CM

## 2020-09-18 DIAGNOSIS — Z98.61 CAD S/P PERCUTANEOUS CORONARY ANGIOPLASTY: ICD-10-CM

## 2020-10-14 ENCOUNTER — TELEPHONE (OUTPATIENT)
Dept: CARDIOLOGY CLINIC | Facility: CLINIC | Age: 75
End: 2020-10-14

## 2023-06-07 NOTE — TELEPHONE ENCOUNTER
Pt is having a procedure to have a port put in on 7/27 and needs Dr Moisés Borges ok to stop baby Aspirin 5 days prior to procedure   Please advise no rashes , no jaundice present , good turgor , no masses , no tenderness on palpation , no rashes , no jaundice present , good turgor , no masses , no tenderness on palpation